# Patient Record
Sex: MALE | Race: WHITE | NOT HISPANIC OR LATINO
[De-identification: names, ages, dates, MRNs, and addresses within clinical notes are randomized per-mention and may not be internally consistent; named-entity substitution may affect disease eponyms.]

---

## 2023-03-08 PROBLEM — Z00.00 ENCOUNTER FOR PREVENTIVE HEALTH EXAMINATION: Status: ACTIVE | Noted: 2023-03-08

## 2023-03-09 ENCOUNTER — APPOINTMENT (OUTPATIENT)
Dept: GASTROENTEROLOGY | Facility: CLINIC | Age: 23
End: 2023-03-09
Payer: COMMERCIAL

## 2023-03-09 VITALS
TEMPERATURE: 97.5 F | OXYGEN SATURATION: 98 % | SYSTOLIC BLOOD PRESSURE: 91 MMHG | HEIGHT: 71 IN | WEIGHT: 135.2 LBS | BODY MASS INDEX: 18.93 KG/M2 | HEART RATE: 66 BPM | DIASTOLIC BLOOD PRESSURE: 70 MMHG

## 2023-03-09 DIAGNOSIS — K58.9 IRRITABLE BOWEL SYNDROME W/OUT DIARRHEA: ICD-10-CM

## 2023-03-09 DIAGNOSIS — R19.7 DIARRHEA, UNSPECIFIED: ICD-10-CM

## 2023-03-09 PROCEDURE — 99214 OFFICE O/P EST MOD 30 MIN: CPT

## 2023-03-09 RX ORDER — HYOSCYAMINE SULFATE 0.38 MG/1
0.38 TABLET, EXTENDED RELEASE ORAL
Qty: 60 | Refills: 1 | Status: COMPLETED | COMMUNITY
Start: 2023-03-09 | End: 2023-05-08

## 2023-03-09 NOTE — ASSESSMENT
[FreeTextEntry1] : The patient is a 23-year-old male who generally enjoys good health.  Patient has developed reflux but notices some improvement since modifying his caffeine intake.  The patient's lower symptoms seem to be compatible with irritable bowel.  The patient does have good days and bad days in the absence of red flag signs.  There is no blood in the stool, no unexplained weight loss, no nocturnal bowel movements or fevers.  The differential diagnosis does include inflammatory bowel disease however due to the patient's family history.  I told the patient to start soluble fiber supplements and a high potency probiotic.  The patient will also use hyoscyamine.  The patient was sent for full blood work as well as comprehensive stool studies.  After review of these results and the patient reporting his symptoms we will decide on whether the patient needs a colonoscopy.  I did not give the patient any medications for his reflux at the present time.

## 2023-03-09 NOTE — HISTORY OF PRESENT ILLNESS
[FreeTextEntry1] : I saw patient Jeff Cornejo the office today.  The patient is a 23-year-old male who generally enjoys good health.  Back is no history of hypertension diabetes or coronary issues.  The patient's appetite is generally good with no recent weight loss.  The patient has noticed several gastrointestinal issues over the past 6 months.  The patient has been noticing some gastroesophageal reflux issues with epigastric burning with no regurgitation or dysphagia.  Patient had been drinking 3-4 servings of caffeine a day but has subsequently cut this down.  Patient may have ethanol during the weekend.  Jeff states that he does vape and occasionally smokes marijuana.  Patient points lately notices change in his bowel habits as well.  The patient used to have 1-2 well-formed bowel movements but now notices a sensation of incomplete evacuation with 4-5 looser bowel movements with some tenesmus there has been no blood in the stool or on the toilet tissue and there are no nocturnal bowel movements.  The patient states he is aware of a hemorrhoid but it is not bleeding.  The patient states that the symptoms are worse when he is at work but during the weekends his bowel frequency lessens. The  patient does have a history of ulcerative colitis both in his grandfather as well as his father.  The patient does not provide a history consistent with lactose intolerance.  Patient has not had  any recent foreign travel.

## 2023-03-09 NOTE — PHYSICAL EXAM
[Alert] : alert [Healthy Appearing] : healthy appearing [No Acute Distress] : no acute distress [Normal] : heart rate was normal and rhythm regular, normal S1 and S2, no murmurs [Bowel Sounds] : normal bowel sounds [Abdomen Tenderness] : non-tender [No Masses] : no abdominal mass palpated [Abdomen Soft] : soft [] : no hepatosplenomegaly

## 2023-03-09 NOTE — REVIEW OF SYSTEMS
[Fever] : no fever [Chills] : no chills [Recent Weight Loss (___ Lbs)] : no recent weight loss [Abdominal Pain] : abdominal pain [Diarrhea] : diarrhea [Heartburn] : heartburn [Melena (black stool)] : no melena [Bleeding] : no bleeding [Negative] : Genitourinary

## 2025-03-12 ENCOUNTER — APPOINTMENT (OUTPATIENT)
Dept: GASTROENTEROLOGY | Facility: CLINIC | Age: 25
End: 2025-03-12